# Patient Record
Sex: FEMALE | Race: WHITE | NOT HISPANIC OR LATINO | URBAN - METROPOLITAN AREA
[De-identification: names, ages, dates, MRNs, and addresses within clinical notes are randomized per-mention and may not be internally consistent; named-entity substitution may affect disease eponyms.]

---

## 2023-06-09 LAB
ANION GAP IN SER/PLAS: 13 MMOL/L (ref 10–20)
CALCIUM (MG/DL) IN SER/PLAS: 9.3 MG/DL (ref 8.6–10.3)
CARBON DIOXIDE, TOTAL (MMOL/L) IN SER/PLAS: 30 MMOL/L (ref 21–32)
CHLORIDE (MMOL/L) IN SER/PLAS: 104 MMOL/L (ref 98–107)
CREATININE (MG/DL) IN SER/PLAS: 1.02 MG/DL (ref 0.5–1.05)
GFR FEMALE: 55 ML/MIN/1.73M2
GLUCOSE (MG/DL) IN SER/PLAS: 99 MG/DL (ref 74–99)
PARATHYRIN INTACT (PG/ML) IN SER/PLAS: 43.2 PG/ML (ref 18.5–88)
PHOSPHATE (MG/DL) IN SER/PLAS: 3.3 MG/DL (ref 2.5–4.9)
POTASSIUM (MMOL/L) IN SER/PLAS: 3.6 MMOL/L (ref 3.5–5.3)
SODIUM (MMOL/L) IN SER/PLAS: 143 MMOL/L (ref 136–145)
UREA NITROGEN (MG/DL) IN SER/PLAS: 33 MG/DL (ref 6–23)

## 2024-02-27 DIAGNOSIS — I10 ESSENTIAL HYPERTENSION: Primary | ICD-10-CM

## 2024-02-27 RX ORDER — CHLORTHALIDONE 25 MG/1
25 TABLET ORAL DAILY
COMMUNITY

## 2024-02-27 RX ORDER — ALLOPURINOL 100 MG/1
100 TABLET ORAL DAILY
COMMUNITY

## 2024-02-27 RX ORDER — METOPROLOL TARTRATE 50 MG/1
50 TABLET ORAL 2 TIMES DAILY
COMMUNITY

## 2024-02-27 RX ORDER — CALCITRIOL 0.25 UG/1
0.25 CAPSULE ORAL EVERY OTHER DAY
COMMUNITY

## 2024-02-27 RX ORDER — POTASSIUM CHLORIDE 750 MG/1
10 TABLET, EXTENDED RELEASE ORAL DAILY
COMMUNITY
End: 2024-02-27 | Stop reason: SDUPTHER

## 2024-02-27 RX ORDER — AMLODIPINE BESYLATE 10 MG/1
10 TABLET ORAL DAILY
COMMUNITY
End: 2024-06-05 | Stop reason: WASHOUT

## 2024-02-27 RX ORDER — POTASSIUM CHLORIDE 750 MG/1
10 TABLET, EXTENDED RELEASE ORAL DAILY
Qty: 90 TABLET | Refills: 1 | Status: SHIPPED | OUTPATIENT
Start: 2024-02-27

## 2024-02-27 RX ORDER — ATORVASTATIN CALCIUM 40 MG/1
0.5 TABLET, FILM COATED ORAL DAILY
COMMUNITY
Start: 2021-06-29

## 2024-02-27 RX ORDER — TRIAMCINOLONE ACETONIDE 0.25 MG/G
1 CREAM TOPICAL 2 TIMES DAILY
COMMUNITY
Start: 2020-08-11

## 2024-02-27 RX ORDER — LISINOPRIL 40 MG/1
40 TABLET ORAL DAILY
COMMUNITY

## 2024-04-08 PROBLEM — N25.81 SECONDARY HYPERPARATHYROIDISM (MULTI): Status: ACTIVE | Noted: 2024-04-08

## 2024-04-08 PROBLEM — N19 RENAL FAILURE: Status: ACTIVE | Noted: 2024-04-08

## 2024-04-08 PROBLEM — E78.5 DYSLIPIDEMIA: Status: ACTIVE | Noted: 2024-04-08

## 2024-04-08 PROBLEM — R00.2 PALPITATIONS: Status: ACTIVE | Noted: 2024-04-08

## 2024-04-08 PROBLEM — N18.30 CRD (CHRONIC RENAL DISEASE), STAGE III (MULTI): Status: ACTIVE | Noted: 2024-04-08

## 2024-04-08 PROBLEM — N18.2 CKD STAGE G2/A1, GFR 60-89 AND ALBUMIN CREATININE RATIO <30 MG/G: Status: ACTIVE | Noted: 2024-04-08

## 2024-04-08 PROBLEM — I10 HYPERTENSION: Status: ACTIVE | Noted: 2024-04-08

## 2024-05-01 ENCOUNTER — TELEPHONE (OUTPATIENT)
Dept: NEPHROLOGY | Facility: CLINIC | Age: 84
End: 2024-05-01
Payer: MEDICARE

## 2024-05-01 DIAGNOSIS — N18.31 STAGE 3A CHRONIC KIDNEY DISEASE (MULTI): ICD-10-CM

## 2024-05-01 DIAGNOSIS — E21.3 HYPERPARATHYROIDISM (MULTI): Primary | ICD-10-CM

## 2024-05-13 ENCOUNTER — LAB (OUTPATIENT)
Dept: LAB | Facility: LAB | Age: 84
End: 2024-05-13
Payer: MEDICARE

## 2024-05-13 ENCOUNTER — TELEPHONE (OUTPATIENT)
Dept: PRIMARY CARE | Facility: CLINIC | Age: 84
End: 2024-05-13

## 2024-05-13 DIAGNOSIS — E21.3 HYPERPARATHYROIDISM (MULTI): ICD-10-CM

## 2024-05-13 DIAGNOSIS — N18.31 STAGE 3A CHRONIC KIDNEY DISEASE (MULTI): ICD-10-CM

## 2024-05-13 LAB
ANION GAP SERPL CALC-SCNC: 11 MMOL/L (ref 10–20)
BUN SERPL-MCNC: 23 MG/DL (ref 6–23)
CALCIUM SERPL-MCNC: 9.8 MG/DL (ref 8.6–10.3)
CHLORIDE SERPL-SCNC: 102 MMOL/L (ref 98–107)
CO2 SERPL-SCNC: 33 MMOL/L (ref 21–32)
CREAT SERPL-MCNC: 0.9 MG/DL (ref 0.5–1.05)
EGFRCR SERPLBLD CKD-EPI 2021: 64 ML/MIN/1.73M*2
GLUCOSE SERPL-MCNC: 106 MG/DL (ref 74–99)
POTASSIUM SERPL-SCNC: 3.8 MMOL/L (ref 3.5–5.3)
PTH-INTACT SERPL-MCNC: 57 PG/ML (ref 18.5–88)
SODIUM SERPL-SCNC: 142 MMOL/L (ref 136–145)

## 2024-05-13 PROCEDURE — 80048 BASIC METABOLIC PNL TOTAL CA: CPT

## 2024-05-13 PROCEDURE — 83970 ASSAY OF PARATHORMONE: CPT

## 2024-05-13 PROCEDURE — 36415 COLL VENOUS BLD VENIPUNCTURE: CPT

## 2024-06-05 ENCOUNTER — OFFICE VISIT (OUTPATIENT)
Dept: NEPHROLOGY | Facility: CLINIC | Age: 84
End: 2024-06-05
Payer: MEDICARE

## 2024-06-05 VITALS
WEIGHT: 150 LBS | SYSTOLIC BLOOD PRESSURE: 137 MMHG | HEART RATE: 59 BPM | BODY MASS INDEX: 24.99 KG/M2 | HEIGHT: 65 IN | DIASTOLIC BLOOD PRESSURE: 77 MMHG

## 2024-06-05 DIAGNOSIS — E21.3 HYPERPARATHYROIDISM (MULTI): ICD-10-CM

## 2024-06-05 DIAGNOSIS — I10 ESSENTIAL HYPERTENSION: ICD-10-CM

## 2024-06-05 DIAGNOSIS — N18.2 CKD (CHRONIC KIDNEY DISEASE) STAGE 2, GFR 60-89 ML/MIN: Primary | ICD-10-CM

## 2024-06-05 PROCEDURE — 99213 OFFICE O/P EST LOW 20 MIN: CPT | Performed by: INTERNAL MEDICINE

## 2024-06-05 PROCEDURE — 3078F DIAST BP <80 MM HG: CPT | Performed by: INTERNAL MEDICINE

## 2024-06-05 PROCEDURE — 1159F MED LIST DOCD IN RCRD: CPT | Performed by: INTERNAL MEDICINE

## 2024-06-05 PROCEDURE — 3075F SYST BP GE 130 - 139MM HG: CPT | Performed by: INTERNAL MEDICINE

## 2024-06-05 RX ORDER — HYDROCORTISONE 25 MG/G
OINTMENT TOPICAL
COMMUNITY
Start: 2024-05-28

## 2024-06-05 RX ORDER — AMMONIUM LACTATE 12 G/100G
LOTION TOPICAL
COMMUNITY

## 2024-06-05 RX ORDER — MAG HYDROX/ALUMINUM HYD/SIMETH 200-200-20
SUSPENSION, ORAL (FINAL DOSE FORM) ORAL 2 TIMES DAILY
COMMUNITY

## 2024-06-05 RX ORDER — VIT A/VIT C/VIT E/ZINC/COPPER 4296-226
1 CAPSULE ORAL
COMMUNITY

## 2024-06-05 NOTE — PROGRESS NOTES
Page Boubacar   83 y.o.    @@  [facility]/Room: [id number]/Room/bed info not found    Subjective:   The patient is being seen for a routine clinic follow-up of chronic kidney disease. Recently, the disease has been stable. Disease complications:  No hyperkalemia, no hypocalcemia, no hyperphosphatemia, no metabolic acidosis, no coagulopathy, no uremic encephalopathy, no neuropathy and no renal osteodystrophy. The patient is currently asymptomatic. No associated symptoms are reported.       Meds:   Current Outpatient Medications   Medication Sig Dispense Refill    allopurinol (Zyloprim) 100 mg tablet Take 1 tablet (100 mg) by mouth once daily.      ammonium lactate (Lac-Hydrin) 12 % lotion APPLY TO AFFECTED AREA AS NEEDED FOR DRY SKIN. APPLY TO AFFECTED AREA DAILY FOR DRY SKIN      atorvastatin (Lipitor) 40 mg tablet Take 0.5 tablets (20 mg) by mouth once daily.      calcitriol (Rocaltrol) 0.25 mcg capsule Take 1 capsule (0.25 mcg) by mouth every other day.      chlorthalidone (Hygroton) 25 mg tablet Take 1 tablet (25 mg) by mouth once daily.      hydrocortisone 1 % ointment Apply topically 2 times a day. to affected area      hydrocortisone 2.5 % ointment APPLY TO AFFECTED AREA TWO TIMES A DAY. ALTERNATE WITH LAC-HYDRIN EVERY THREE DAYS      lisinopril 40 mg tablet Take 1 tablet (40 mg) by mouth once daily.      metoprolol tartrate (Lopressor) 50 mg tablet Take 1 tablet by mouth 2 times a day.      potassium chloride CR 10 mEq ER tablet Take 1 tablet (10 mEq) by mouth once daily. 90 tablet 1    triamcinolone (Kenalog) 0.025 % cream Apply 1 Application topically 2 times a day.      vitamins A,C,E-zinc-copper (PreserVision AREDS) 4,296 mcg-226 mg-90 mg capsule Take 1 capsule by mouth.       No current facility-administered medications for this visit.          ROS:  The patient is awake and oriented. No dizziness or lightheadedness. No chills and no fever. No headaches. No nausea and no vomiting. No shortness of breath. No  cough. No sputum. No chest pain. No chest tightness. No abdominal pain. No diarrhea and no constipation. No hematemesis or hemoptysis. No hematuria. No rectal bleeding. No melena. No epistaxis. No urinary symptoms. No flank pain. No leg edema. No leg pain. No weakness. No itching. Overall, the rest of the review of systems is also negative.  12 point review of systems otherwise negative as stated in HPI.        Physical Examination:        Vitals:    06/05/24 1031   BP: 137/77   Pulse: 59     General: The patient is awake, oriented, and is not in any distress.  Head and Neck: Normocephalic. No periorbital edema.  Eyes: non-icteric  Respiratory: Symmetric air entry. Symmetric chest expansion.No respiratory distress.  Cardiovascular: Symmetric peripheral pulses.  Skin: No maculopapular rash.  Abdomen: soft, nt/nd  Musculoskeletal: No peripheral edema in both left and right upper extremities.  No edema in either left or right lower extremities.  Neuro Exam: Speech is fluent. Moves extremities.    Imaging:         Blood Labs:  No results found for this or any previous visit (from the past 24 hour(s)).   Lab Results   Component Value Date    PTH 57.0 05/13/2024    PHOS 3.3 06/09/2023      Lab Results   Component Value Date    GLUCOSE 106 (H) 05/13/2024    CALCIUM 9.8 05/13/2024     05/13/2024    K 3.8 05/13/2024    CO2 33 (H) 05/13/2024     05/13/2024    BUN 23 05/13/2024    CREATININE 0.90 05/13/2024         Assessment and Plan:  1. Chronic kidney disease stage II/III: Last Cr level is 0.9. Stable kidney function. Normal electrolytes. Volume status is good. Blood pressure is under control.     2. Secondary hyperparathyroidism. On calcitriol with good PTH level.     3. Hypertension. Blood pressure is under control. Continue the current medications.          She will be seen in my office in about 1 year for followup.           Brett Merino MD  Senior Attending Physician  Director of Onco-Nephrology  Program  Division of Nephrology & Hypertension  [facility]

## 2024-07-16 DIAGNOSIS — E21.3 HYPERPARATHYROIDISM (MULTI): Primary | ICD-10-CM

## 2024-07-16 RX ORDER — CALCITRIOL 0.25 UG/1
0.25 CAPSULE ORAL EVERY OTHER DAY
Qty: 45 CAPSULE | Refills: 1 | Status: SHIPPED | OUTPATIENT
Start: 2024-07-16

## 2024-10-10 DIAGNOSIS — I10 ESSENTIAL HYPERTENSION: ICD-10-CM

## 2024-10-14 RX ORDER — POTASSIUM CHLORIDE 750 MG/1
10 TABLET, EXTENDED RELEASE ORAL DAILY
Qty: 90 TABLET | Refills: 3 | Status: SHIPPED | OUTPATIENT
Start: 2024-10-14

## 2024-10-14 RX ORDER — CHLORTHALIDONE 25 MG/1
25 TABLET ORAL DAILY
Qty: 90 TABLET | Refills: 3 | Status: SHIPPED | OUTPATIENT
Start: 2024-10-14

## 2025-02-01 DIAGNOSIS — E21.3 HYPERPARATHYROIDISM (MULTI): ICD-10-CM

## 2025-02-03 RX ORDER — CALCITRIOL 0.25 UG/1
0.25 CAPSULE ORAL EVERY OTHER DAY
Qty: 45 CAPSULE | Refills: 1 | Status: SHIPPED | OUTPATIENT
Start: 2025-02-03

## 2025-05-29 LAB
ANION GAP SERPL CALCULATED.4IONS-SCNC: 12 MMOL/L (CALC) (ref 7–17)
BUN SERPL-MCNC: 17 MG/DL (ref 7–25)
BUN/CREAT SERPL: ABNORMAL (CALC) (ref 6–22)
CALCIUM SERPL-MCNC: 9 MG/DL (ref 8.6–10.4)
CHLORIDE SERPL-SCNC: 97 MMOL/L (ref 98–110)
CO2 SERPL-SCNC: 28 MMOL/L (ref 20–32)
CREAT SERPL-MCNC: 0.8 MG/DL (ref 0.6–0.95)
EGFRCR SERPLBLD CKD-EPI 2021: 74 ML/MIN/1.73M2
GLUCOSE SERPL-MCNC: 114 MG/DL (ref 65–99)
POTASSIUM SERPL-SCNC: 3.4 MMOL/L (ref 3.5–5.3)
PTH-INTACT SERPL-MCNC: 35 PG/ML (ref 16–77)
SODIUM SERPL-SCNC: 137 MMOL/L (ref 135–146)

## 2025-06-19 ENCOUNTER — APPOINTMENT (OUTPATIENT)
Dept: NEPHROLOGY | Facility: CLINIC | Age: 85
End: 2025-06-19
Payer: MEDICARE

## 2025-06-19 ENCOUNTER — TELEPHONE (OUTPATIENT)
Dept: NEPHROLOGY | Facility: CLINIC | Age: 85
End: 2025-06-19

## 2025-06-19 VITALS
BODY MASS INDEX: 23.93 KG/M2 | WEIGHT: 143.6 LBS | HEART RATE: 84 BPM | SYSTOLIC BLOOD PRESSURE: 126 MMHG | DIASTOLIC BLOOD PRESSURE: 69 MMHG | HEIGHT: 65 IN

## 2025-06-19 DIAGNOSIS — N18.2 CKD (CHRONIC KIDNEY DISEASE) STAGE 2, GFR 60-89 ML/MIN: Primary | ICD-10-CM

## 2025-06-19 DIAGNOSIS — E21.3 HYPERPARATHYROIDISM (MULTI): ICD-10-CM

## 2025-06-19 DIAGNOSIS — I10 ESSENTIAL HYPERTENSION: ICD-10-CM

## 2025-06-19 PROCEDURE — 3074F SYST BP LT 130 MM HG: CPT | Performed by: INTERNAL MEDICINE

## 2025-06-19 PROCEDURE — 99213 OFFICE O/P EST LOW 20 MIN: CPT | Performed by: INTERNAL MEDICINE

## 2025-06-19 PROCEDURE — 3078F DIAST BP <80 MM HG: CPT | Performed by: INTERNAL MEDICINE

## 2025-06-19 PROCEDURE — 1159F MED LIST DOCD IN RCRD: CPT | Performed by: INTERNAL MEDICINE

## 2025-06-19 RX ORDER — AMLODIPINE AND BENAZEPRIL HYDROCHLORIDE 10; 40 MG/1; MG/1
1 CAPSULE ORAL
COMMUNITY
Start: 2025-03-17

## 2025-06-19 NOTE — TELEPHONE ENCOUNTER
----- Message from Brett Merino sent at 5/29/2025  9:53 AM EDT -----  1.  Kidney function is fine.  2.  Potassium level is minimally on low side.  ----- Message -----  From: [facility] [employer] Results In  Sent: 5/29/2025   5:54 AM EDT  To: Brett Merino MD

## 2025-06-19 NOTE — PROGRESS NOTES
Page Boubacar   84 y.o.    @@  [facility]/Room: [id number]/Room/bed info not found    Subjective:   The patient is being seen for a routine clinic follow-up of chronic kidney disease. Recently, the disease has been stable. Disease complications:  No hyperkalemia, no hypocalcemia, no hyperphosphatemia, no metabolic acidosis, no coagulopathy, no uremic encephalopathy, no neuropathy and no renal osteodystrophy. The patient is currently asymptomatic. No associated symptoms are reported.       Meds:   Current Medications[1]       ROS:  The patient is awake and oriented. No dizziness or lightheadedness. No chills and no fever. No headaches. No nausea and no vomiting. No shortness of breath. No cough. No chest pain. No abdominal pain. No diarrhea. No hematemesis or hemoptysis. No hematuria. No rectal bleeding. No melena. No epistaxis. No urinary symptoms. No flank pain. No leg edema. No itching. Overall, the rest of the review of systems is also negative.  12 point review of systems otherwise negative as stated in HPI.        Physical Examination:        Vitals:    06/19/25 0955   BP: 126/69   Pulse: 84     General: The patient is awake, oriented, and is not in any distress.  Head and Neck: Normocephalic. No periorbital edema.  Eyes: non-icteric  Respiratory: Symmetric chest expansion. No respiratory distress.  Skin: No maculopapular rash.  Musculoskeletal: No peripheral edema.  Neuro Exam: Speech is fluent. Moves extremities.    Imaging:         Blood Labs:  No results found for this or any previous visit (from the past 24 hours).   Lab Results   Component Value Date    PTH 35 05/28/2025    PHOS 3.3 06/09/2023      Lab Results   Component Value Date    GLUCOSE 114 (H) 05/28/2025    CALCIUM 9.0 05/28/2025     05/28/2025    K 3.4 (L) 05/28/2025    CO2 28 05/28/2025    CL 97 (L) 05/28/2025    BUN 17 05/28/2025    CREATININE 0.80 05/28/2025             Assessment and Plan:  1. Chronic kidney disease stage II/III: Last Cr level  is 0.8. Stable kidney function. Volume status is good. Blood pressure is under control.     2. Secondary hyperparathyroidism. On calcitriol with good PTH level.     3. Hypertension. Blood pressure is under control. Continue the current medications.        Overall stable from renal standpoint.  She will follow-up with her primary care physician and I will see her as needed.        Brett Merino MD  Senior Attending Physician  Director of Onco-Nephrology Program  Division of Nephrology & Hypertension  [facility]       [1]   Current Outpatient Medications   Medication Sig Dispense Refill    allopurinol (Zyloprim) 100 mg tablet Take 1 tablet (100 mg) by mouth once daily.      amLODIPine-benazepriL (Lotrel) 10-40 mg capsule Take 1 capsule by mouth once daily.      ammonium lactate (Lac-Hydrin) 12 % lotion APPLY TO AFFECTED AREA AS NEEDED FOR DRY SKIN. APPLY TO AFFECTED AREA DAILY FOR DRY SKIN      atorvastatin (Lipitor) 40 mg tablet Take 0.5 tablets (20 mg) by mouth once daily.      calcitriol (Rocaltrol) 0.25 mcg capsule TAKE 1 CAPSULE (0.25 MCG) BY MOUTH EVERY OTHER DAY 45 capsule 1    chlorthalidone (Hygroton) 25 mg tablet TAKE 1 TABLET BY MOUTH EVERY DAY 90 tablet 3    hydrocortisone 1 % ointment Apply topically 2 times a day. to affected area      lisinopril 40 mg tablet Take 1 tablet (40 mg) by mouth once daily.      potassium chloride CR 10 mEq ER tablet TAKE 1 TABLET BY MOUTH ONCE DAILY. 90 tablet 3    triamcinolone (Kenalog) 0.025 % cream Apply 1 Application topically 2 times a day.      vitamins A,C,E-zinc-copper (PreserVision AREDS) 4,296 mcg-226 mg-90 mg capsule Take 1 capsule by mouth.      hydrocortisone 2.5 % ointment APPLY TO AFFECTED AREA TWO TIMES A DAY. ALTERNATE WITH LAC-HYDRIN EVERY THREE DAYS (Patient not taking: Reported on 6/19/2025)      metoprolol tartrate (Lopressor) 50 mg tablet Take 1 tablet by mouth 2 times a day. (Patient not taking: Reported on  6/19/2025)       No current facility-administered medications for this visit.

## 2025-08-05 DIAGNOSIS — E21.3 HYPERPARATHYROIDISM (MULTI): ICD-10-CM

## 2025-08-06 RX ORDER — CALCITRIOL 0.25 UG/1
0.25 CAPSULE ORAL EVERY OTHER DAY
Qty: 45 CAPSULE | Refills: 1 | Status: SHIPPED | OUTPATIENT
Start: 2025-08-06